# Patient Record
Sex: MALE | Race: WHITE | NOT HISPANIC OR LATINO | Employment: STUDENT | URBAN - METROPOLITAN AREA
[De-identification: names, ages, dates, MRNs, and addresses within clinical notes are randomized per-mention and may not be internally consistent; named-entity substitution may affect disease eponyms.]

---

## 2018-01-12 NOTE — PROGRESS NOTES
Assessment    1  Encounter for preventive health examination (V70 0) (Z00 00)   2  Sports physical (V70 3) (Z02 5)    Discussion/Summary    Form completed for school  See chart copy  Chief Complaint  PE      History of Present Illness  HM, Adult Male: The patient is being seen for a health maintenance evaluation  General Health: The patient's health since the last visit is described as good  He has regular dental visits  He denies vision problems  He denies hearing loss  Immunizations status: up to date  Lifestyle:  He consumes a diverse and healthy diet  He does not have any weight concerns  He exercises regularly  He does not use tobacco  He denies alcohol use  He denies drug use  Screening: cancer screening reviewed and current  metabolic screening reviewed and current  risk screening reviewed and current  HPI: Patient here for first time visit for sports physical  Old records are not currently available  Review of Systems    Constitutional: No fever or chills, feels well, no tiredness, no recent weight gain or weight loss  Eyes: No complaints of eye pain, no red eyes, no discharge from eyes, no itchy eyes  ENT: no complaints of earache, no hearing loss, no nosebleeds, no nasal discharge, no sore throat, no hoarseness  Cardiovascular: No complaints of slow heart rate, no fast heart rate, no chest pain, no palpitations, no leg claudication, no lower extremity  Respiratory: No complaints of shortness of breath, no wheezing, no cough, no SOB on exertion, no orthopnea or PND  Gastrointestinal: No complaints of abdominal pain, no constipation, no nausea or vomiting, no diarrhea or bloody stools  Genitourinary: No complaints of dysuria, no incontinence, no hesitancy, no nocturia, no genital lesion, no testicular pain  Musculoskeletal: No complaints of arthralgia, no myalgias, no joint swelling or stiffness, no limb pain or swelling     Integumentary: No complaints of skin rash or skin lesions, no itching, no skin wound, no dry skin  Neurological: No compliants of headache, no confusion, no convulsions, no numbness or tingling, no dizziness or fainting, no limb weakness, no difficulty walking  Psychiatric: Is not suicidal, no sleep disturbances, no anxiety or depression, no change in personality, no emotional problems  Endocrine: No complaints of proptosis, no hot flashes, no muscle weakness, no erectile dysfunction, no deepening of the voice, no feelings of weakness  Hematologic/Lymphatic: No complaints of swollen glands, no swollen glands in the neck, does not bleed easily, no easy bruising  Active Problems    1  Lumbar strain (847 2) (S39 012A)    Social History    · Never a smoker    Current Meds   1  No Reported Medications Recorded    Allergies    1  No Known Drug Allergies    Vitals   Recorded: [id number] 30:89XN   Systolic 96   Diastolic 62   Temperature 97 7 F   Height 5 ft 11 in   Weight 192 lb    BMI Calculated 26 78   BSA Calculated 2 07   BMI Percentile 87 %   2-20 Stature Percentile 69 %   2-20 Weight Percentile 90 %     Physical Exam    Constitutional   General appearance: No acute distress, well appearing and well nourished  Eyes   Conjunctiva and lids: No swelling, erythema, or discharge  Pupils and irises: Equal, round and reactive to light  Ears, Nose, Mouth, and Throat   External inspection of ears and nose: Normal     Otoscopic examination: Tympanic membrance translucent with normal light reflex  Canals patent without erythema  Oropharynx: Normal with no erythema, edema, exudate or lesions  Pulmonary   Respiratory effort: No increased work of breathing or signs of respiratory distress  Auscultation of lungs: Clear to auscultation  Cardiovascular   Palpation of heart: Normal PMI, no thrills  Auscultation of heart: Normal rate and rhythm, normal S1 and S2, without murmurs      Examination of extremities for edema and/or varicosities: Normal     Abdomen   Abdomen: Non-tender, no masses  Liver and spleen: No hepatomegaly or splenomegaly  Lymphatic   Palpation of lymph nodes in neck: No lymphadenopathy  Musculoskeletal   Gait and station: Normal     Digits and nails: Normal without clubbing or cyanosis  Inspection/palpation of joints, bones, and muscles: Normal     Skin   Skin and subcutaneous tissue: Normal without rashes or lesions  Neurologic   Cranial nerves: Cranial nerves 2-12 intact  Reflexes: 2+ and symmetric  Sensation: No sensory loss      Psychiatric   Orientation to person, place and time: Normal     Mood and affect: Normal        Signatures   Electronically signed by : Eric Phillips DO; Aug  5 2016 11:46AM EST                       (Author)

## 2018-12-27 ENCOUNTER — OFFICE VISIT (OUTPATIENT)
Dept: FAMILY MEDICINE CLINIC | Facility: CLINIC | Age: 23
End: 2018-12-27
Payer: COMMERCIAL

## 2018-12-27 ENCOUNTER — TELEPHONE (OUTPATIENT)
Dept: NEUROLOGY | Facility: CLINIC | Age: 23
End: 2018-12-27

## 2018-12-27 VITALS
OXYGEN SATURATION: 99 % | HEIGHT: 72 IN | DIASTOLIC BLOOD PRESSURE: 64 MMHG | HEART RATE: 64 BPM | TEMPERATURE: 97.8 F | BODY MASS INDEX: 24.11 KG/M2 | SYSTOLIC BLOOD PRESSURE: 102 MMHG | WEIGHT: 178 LBS

## 2018-12-27 DIAGNOSIS — R41.3 MEMORY DIFFICULTIES: Primary | ICD-10-CM

## 2018-12-27 PROBLEM — D48.19 NEOPLASM OF UNCERTAIN BEHAVIOR OF SOFT TISSUE: Status: ACTIVE | Noted: 2018-12-27

## 2018-12-27 PROBLEM — D48.1 NEOPLASM OF UNCERTAIN BEHAVIOR OF SOFT TISSUE: Status: ACTIVE | Noted: 2018-12-27

## 2018-12-27 PROCEDURE — 99213 OFFICE O/P EST LOW 20 MIN: CPT | Performed by: FAMILY MEDICINE

## 2018-12-27 PROCEDURE — 1036F TOBACCO NON-USER: CPT | Performed by: FAMILY MEDICINE

## 2018-12-27 NOTE — PROGRESS NOTES
Assessment/Plan:  Recommend consideration for follow-up with neurologist   Also recommend consideration for MRI of the brain considering chronic symptoms over the last 2 months that have changed  We discussed the possibility of anxiety or depression but patient does not feel anxious or depressed  He will call with any new, worsening or persisting symptoms  No problem-specific Assessment & Plan notes found for this encounter  Diagnoses and all orders for this visit:    Memory difficulties  -     MRI brain wo contrast; Future  -     Ambulatory referral to Neurology; Future          Subjective:      Patient ID: Fernando Boyer is a 26 y o  male  Patient notes that over the last 1-2 months he has had a difficult time with some memory issues  He has for gotten a few things that other people have noticed  He denies any recent head trauma but does play lacrosse  He denies any headaches or diplopia  No neck pain  He states that it is mostly short-term memory that he is having difficulty with  He states that he had difficulty with school work over the last few months as well  The following portions of the patient's history were reviewed and updated as appropriate: allergies, current medications, past family history, past medical history, past social history, past surgical history and problem list     Review of Systems   Psychiatric/Behavioral: Positive for decreased concentration  Objective:      /64 (BP Location: Left arm, Patient Position: Sitting, Cuff Size: Standard)   Pulse 64   Temp 97 8 °F (36 6 °C) (Tympanic)   Ht 5' 11 65" (1 82 m)   Wt 80 7 kg (178 lb)   SpO2 99%   BMI 24 37 kg/m²          Physical Exam   Constitutional: He is oriented to person, place, and time  He appears well-developed and well-nourished  HENT:   Head: Normocephalic and atraumatic  Right Ear: External ear normal  Tympanic membrane is not erythematous and not bulging     Left Ear: External ear normal  Tympanic membrane is not erythematous and not bulging  Nose: Nose normal    Mouth/Throat: Oropharynx is clear and moist and mucous membranes are normal  No oral lesions  No oropharyngeal exudate  Eyes: Conjunctivae and EOM are normal  Right eye exhibits no discharge  Left eye exhibits no discharge  No scleral icterus  Neck: Normal range of motion  Neck supple  No thyromegaly present  Cardiovascular: Normal rate, regular rhythm and normal heart sounds  Exam reveals no gallop and no friction rub  No murmur heard  Pulmonary/Chest: Effort normal  No respiratory distress  He has no wheezes  He has no rales  He exhibits no tenderness  Abdominal: Soft  Bowel sounds are normal  He exhibits no distension and no mass  There is no tenderness  There is no rebound and no guarding  Musculoskeletal: Normal range of motion  He exhibits no edema, tenderness or deformity  Lymphadenopathy:     He has no cervical adenopathy  Neurological: He is alert and oriented to person, place, and time  He has normal reflexes  No cranial nerve deficit  He exhibits normal muscle tone  Coordination normal    Skin: Skin is warm and dry  No rash noted  No erythema  No pallor  Psychiatric: He has a normal mood and affect  His behavior is normal    Vitals reviewed

## 2018-12-28 ENCOUNTER — TELEPHONE (OUTPATIENT)
Dept: FAMILY MEDICINE CLINIC | Facility: CLINIC | Age: 23
End: 2018-12-28

## 2018-12-28 NOTE — TELEPHONE ENCOUNTER
Spoke with [facility] through patients insurance to get MRI of Brain auth  It has been denied stating it does not meet medical necessity criteria  [facility] can be contacted at [phone] for [id number] and case closes 01/01/19  Please advise your recommendation

## 2019-01-02 NOTE — TELEPHONE ENCOUNTER
Austin Hernandez from [facility] called just to report some denial reasons as they just closed this case today: stated it does not meet medical necessity - pt does not show he has trouble speaking, showing weakness, or trouble thinking

## 2019-01-07 NOTE — PROGRESS NOTES
DEPARTMENT OF NEUROLOGICAL SCIENCES  [address] and [facility]        NEW PATIENT EVALUATION NOTE    Patient: [address]ichi [address] Record Number: # [id number]  YOB: 1995  Date of visit: 1/8/2019    Referring provider: Thee Jonas DO    Diagnoses for this encounter:  1  Subjective memory complaints  Ambulatory referral to Neurology    Comprehensive metabolic panel    TSH, 3rd generation with Free T4 reflex    Vitamin B12    Lyme Antibody Profile with reflex to WB    Vitamin D 25 hydroxy     ASSESSMENT     Impression of this 25 yo gentleman with several month history of mild short-term memory difficulties and difficulty getting organized, who is able to function at home well and no abnormal physical signs or symptoms  He reports mild anxiety surrounding schoolwork and his parent's divorce which was finalized only recently  The recent stresses of Finals likely contributed to his worsened insomnia, but now his sleep is better  His MOCA is a 27/30 normal today with 3/5 recall initially, and 3/3 word recall when tested with a different set of words  He takes no medications currently  We will rule out any common and reversible contributing factor to cognitive deficits as below  PLAN     · No previous relevant laboratory workup  Would like to check CMP, TSH, free T4, vitamin D, and Vitamin B12, Lyme ab for reversible contributors to cognitive issues  · No previous relevant neuroimaging available  No new scan necessary at this time  If symptoms change, then may revisit MRI order  · Suggested to keep notes and writing things down, using smartphone apps for calendar alerts to keep organized  · He will follow up with his counselor regarding ongoing concerns about school, work and his parents      · Thank you very much for sending me this interesting patient  · The patient has been instructed to call us about any new neurological problems or medication side effects  · Return to Clinic in 6 months or as needed basis if symptoms improve  A total of 60 minutes were spent face-to-face with this patient, of which at least 50% was spent on counseling and coordination of care  We discussed the natural history of the patient's condition, differential diagnosis, level of diagnostic certainty, treatment alternatives and their side effects and possible complications  HISTORY OF PRESENT ILLNESS:     Mr Shravan Ivey is a 26 y o  right handed male who has been referred to the [facility] for evaluation of memory issues  The patient was not accompanied today  History was obtained from patient     Main bothersome symptoms today are:   1  Memory issues -   He says starting Nov 2018 he was told by his girlfriend he was forgetful about anything she says (previous conversations, dates etc)  This was gradual  Around Thanksgiving break 2018 he was home and his mother had mentioned something he did that Oswaldo did not recall at all  During his [address] in mid-Dec 2018 at college, he would be "drawing blanks" on certain words or concepts  Denies forgetting names or faces of people  Endorses some word-finding difficulty  He feels he is less organized now with dates, times, appointments  He has lost his wallet twice but recovered it  Denies safety concerns such as getting lost, driving concerns  Overall since Nov 2018 he feels symptoms have slightly worsened  He does endorse difficulty falling asleep - usually goes to bed at 11pm but cannot fall asleep usually until 1am at latest  He had a week where he was unable to sleep until 3-4am  He says he thinks mainly about school and lacrosse related issues  Endorses daytime sleepiness after [facility] [address] [address] [facility], he says he has a "fair"  He had been very active with his lacrosse team during the Fall       - He plays lacrosse but denies any head injuries from it, denies any surgeries on the head or neck or infections  Denies regular headache, nausea, vomiting, vision changes  Denies imbalance or gait issues  - He does endorse some borderline depression and he plans on seeing a counselor for it    - His parents had been  2 years ago but officially  legally in Dec 2018  - Denies any known family history of memory issues or dementia  Living Situation + ADLs: living at college, has girlfriend  Can execute all ADLs and IADLs well  Medications tried in the past with side effects:   1  none     REVIEW OF PAST MEDICAL, SOCIAL AND FAMILY HISTORY:  This is the list of problems as per our Medical Records:    Patient Active Problem List    Diagnosis Date Noted    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   Cyst removed from hand  History reviewed  No pertinent past medical history  Past Surgical History:   Procedure Laterality Date    WISDOM TOOTH EXTRACTION        No Known Allergies     No outpatient encounter prescriptions on file as of 1/8/2019  No facility-administered encounter medications on file as of 1/8/2019  Social History   Substance Use Topics    Smoking status: Former Smoker     Quit date: 2018    Smokeless tobacco: Former User      Comment: vaping for 1 month    Alcohol use Yes      Comment: socially, 1-2 shots of liquor a week        Family History   Problem Relation Age of Onset    Thyroid cancer Father     Lung cancer Maternal Grandfather     Lung cancer Paternal Grandfather         REVIEW OF SYSTEMS:  The patient has entered data on an intake form regarding present illness, past medical and surgical history, medications, allergies, family and social history, and a full review of 14 systems  I have reviewed this form with the patient, and all the relevant information has been included on this note  The full review of systems was negative except as stated in HPI and below  Constitutional: Negative  Negative for appetite change and fever  HENT: Positive for sinus pressure  Negative for hearing loss, tinnitus, trouble swallowing and voice change  Eyes: Negative  Negative for photophobia and pain  Respiratory: Negative  Negative for shortness of breath  Cardiovascular: Negative  Negative for palpitations  Gastrointestinal: Negative  Negative for nausea  Endocrine: Negative  Negative for cold intolerance and heat intolerance  Genitourinary: Negative  Negative for dysuria, frequency and urgency  Musculoskeletal: Negative  Negative for myalgias and neck pain  Skin: Negative  Allergic/Immunologic: Negative  Neurological: Negative for dizziness, tremors, seizures, syncope, facial asymmetry, speech difficulty, weakness and numbness  Positive for memory problems  Hematological: Negative  Does not bruise/bleed easily  Psychiatric/Behavioral: Positive for sleep disturbance (trouble falling asleep)  Negative for confusion and hallucinations  PHYSICAL EXAMINATION:     Vital signs:  /62 (BP Location: Right arm, Patient Position: Sitting, Cuff Size: Standard)   Pulse 85   Ht 5' 11" (1 803 m)   Wt 81 4 kg (179 lb 8 oz)   BMI 25 04 kg/m²     General:  Well-appearing, well nourished, pleasant patient in no acute distress  Mood and Fund of Knowledge are appropriate  Head:  Normocephalic, atraumatic  Oropharynx and conjunctiva are clear  No rashes or swellings  Speech  No hypophonia, no bradylalia  No scanning speech  Language: Comprehension intact  Neck:  Supple, strong 5/5 forward flexion and retroflexion     Extremities: Range of motion is normal       Cognitive and Mental Exam:  MOCA    Points MAX   Visuospatial  ----------   Trails A 1 1   Cube Drawing 0 1   Clock Drawing 3 3   Naming Objects 3 3   Attention  ----------   Digit Span 2 2   Letter Reading 1 1   Serial 7s 3 3   Language  ----------   Repetition 2 2   Fluency 1 1   Abstraction 2 2   Delayed Recall 3 5   Orientation               6 6              27 30   +0 for college education = 27   Apraxia: none    Cranial Nerves:  CN II:  Funduscopic examination reveals no papilledema  Direct and consensual light reflexes were equally reactive to light symmetrically  No afferent pupillary defect   Visual fields are full to confrontation  CN III / IV / VI: Extraocular movements were full, with normal pursuit and saccades  CN V:   Facial sensation to light touch was intact  CN VII: Face is symmetric with normal strength  CN VIII: Hearing was assessed using the Calibrated Finger Rub Auditory Screening Test (CALFRAST) and was not abnormal (Better than CALFRAST-Strong-70)  CN X:   Palate is up going bilaterally and symmetrically  CN XI:  Neck muscles are strong  CN XII: Tongue protrusion is at midline with normal movements  No dysarthria  Motor:    Dystonia: none  Dyskinesia: none  Myoclonus: none  Chorea: none  Tics: none      Partial MDS-UPDRS III:   Speech: 0  Facial Expression: 0  Tremor (Head):  0  Rest Tremor Severity (RUE/LUE/RLE/LLE/Lip): 0/0/0/0/0  Action Tremor of Hands (R): 0  Action Tremor of Hands (L): 0  Finger tapping (R): 0  Finger tapping (L): 0  Hand clenching (R): 0  Hand clenching (L): 0  KISHAN Hand (R): 0  KISHAN Hand (L): 0    No rigidity in any limb    Arising From Chair: 0  Posture: 0  Gait: 0  Freezing of Gait: 0  Postural Stability: -  Body Bradykinesia: 0  -------------------------------------------------------------------------------------    Muscle Strength Right Left  Muscle Strength Right Left   Deltoid 5/5 5/5  Hip Adductors 5/5 5/5   Biceps 5/5 5/5  Hip Abductors 5/5 5/5   Triceps 5/5 5/5  Knee Extensors 5/5 5/5   Wrist Extensors 5/5 5/5  Knee Flexors 5/5 5/5   Wrist Flexors 5/5 5/5  Ankle Extensors 5/5 5/5    5/5 5/5  Ankle Flexors 5/5 5/5   Finger Abductors 5/5 5/5       Hip Flexors 5/5 5/5   Hip Extensors 5/5 5/5     Sensory  Intact to Light Touch, Temperature, and vibration sense in all extremities  Coordination:  Finger-to-nose-finger: normal     Gait:  Normal comprehensive gait evaluation, has normal raising, stance, gait, turns  Reflexes:    Right Left   Biceps 2/4 2/4   Brachioradialis 2/4 2/4   Triceps 2/4 2/4   Knee 2/4 2/4   Ankle 2/4 2/4      Plantar cutaneous reflex:  Right: flexor  Left: flexor      REVIEW OF ANCILLARY TESTS:   No results found for this or any previous visit

## 2019-01-08 ENCOUNTER — OFFICE VISIT (OUTPATIENT)
Dept: NEUROLOGY | Facility: CLINIC | Age: 24
End: 2019-01-08
Payer: COMMERCIAL

## 2019-01-08 VITALS
BODY MASS INDEX: 25.13 KG/M2 | WEIGHT: 179.5 LBS | DIASTOLIC BLOOD PRESSURE: 62 MMHG | SYSTOLIC BLOOD PRESSURE: 108 MMHG | HEART RATE: 85 BPM | HEIGHT: 71 IN

## 2019-01-08 DIAGNOSIS — R41.89 SUBJECTIVE MEMORY COMPLAINTS: Primary | ICD-10-CM

## 2019-01-08 PROCEDURE — 99244 OFF/OP CNSLTJ NEW/EST MOD 40: CPT | Performed by: PSYCHIATRY & NEUROLOGY

## 2019-01-08 NOTE — LETTER
January 8, 2019     Ashley SifuentesArturotierakssebastián 52    Patient: Ermelinda Recio   YOB: 1995   Date of Visit: 1/8/2019       Dear Dr Jo Ann David: Thank you for referring Ermelinda Recio to me for evaluation  Below are my notes for this consultation  If you have questions, please do not hesitate to call me  I look forward to following your patient along with you  Sincerely,        Victory Merlin, MD        CC: No Recipients  Minerva Langston  1/8/2019 10:37 AM  Sign at close encounter  Review of Systems   Constitutional: Negative  Negative for appetite change and fever  HENT: Positive for sinus pressure  Negative for hearing loss, tinnitus, trouble swallowing and voice change  Eyes: Negative  Negative for photophobia and pain  Respiratory: Negative  Negative for shortness of breath  Cardiovascular: Negative  Negative for palpitations  Gastrointestinal: Negative  Negative for nausea  Endocrine: Negative  Negative for cold intolerance and heat intolerance  Genitourinary: Negative  Negative for dysuria, frequency and urgency  Musculoskeletal: Negative  Negative for myalgias and neck pain  Skin: Negative  Allergic/Immunologic: Negative  Neurological: Negative for dizziness, tremors, seizures, syncope, facial asymmetry, speech difficulty, weakness and numbness  Positive for memory problems     Hematological: Negative  Does not bruise/bleed easily  Psychiatric/Behavioral: Positive for sleep disturbance (trouble falling asleep)  Negative for confusion and hallucinations         Victory Merlin, MD  1/8/2019 12:04 PM  Sign at close encounter  [address] [facility] PATIENT EVALUATION NOTE    Patient: [address] [facility] Record Number: # [id number]  YOB: 1995  Date of visit: 1/8/2019    Referring provider: Zee Louise DO    Diagnoses for this encounter:  1  Memory difficulties  Ambulatory referral to Neurology     ASSESSMENT     Impression of this 25 yo gentleman with several month history of mild short-term memory difficulties and difficulty getting organized, who is able to function at home well and no abnormal physical signs or symptoms  He reports mild anxiety surrounding schoolwork and his parent's divorce which was finalized only recently  The recent stresses of Finals likely contributed to his worsened insomnia, but now his sleep is better  His MOCA is a 27/30 normal today with 3/5 recall initially, and 3/3 word recall when tested with a different set of words  He takes no medications currently  We will rule out any common and reversible contributing factor to cognitive deficits as below  PLAN     · No previous relevant laboratory workup  Would like to check CMP, TSH, free T4, vitamin D, and Vitamin B12, Lyme ab for reversible contributors to cognitive issues  · No previous relevant neuroimaging available  No new scan necessary at this time  If symptoms change, then may revisit MRI order  · Suggested to keep notes and writing things down, using smartphone apps for calendar alerts to keep organized  · He will follow up with his counselor regarding ongoing concerns about school, work and his parents  · Thank you very much for sending me this interesting patient  · The patient has been instructed to call us about any new neurological problems or medication side effects  · Return to Clinic in 6 months or as needed basis if symptoms improve  A total of 60 minutes were spent face-to-face with this patient, of which at least 50% was spent on counseling and coordination of care  We discussed the natural history of the patient's condition, differential diagnosis, level of diagnostic certainty, treatment alternatives and their side effects and possible complications       HISTORY OF PRESENT ILLNESS:     Mr Addy Mccollum is a 26 y o  right handed male who has been referred to the [facility] for evaluation of memory issues  The patient was not accompanied today  History was obtained from patient     Main bothersome symptoms today are:   1  Memory issues -   He says starting Nov 2018 he was told by his girlfriend he was forgetful about anything she says (previous conversations, dates etc)  This was gradual  Around Thanksgiving break 2018 he was home and his mother had mentioned something he did that Oswaldo did not recall at all  During his [address] [place] in mid-Dec 2018 at college, he would be "drawing blanks" on certain words or concepts  Denies forgetting names or faces of people  Endorses some word-finding difficulty  He feels he is less organized now with dates, times, appointments  He has lost his wallet twice but recovered it  Denies safety concerns such as getting lost, driving concerns  Overall since Nov 2018 he feels symptoms have slightly worsened  He does endorse difficulty falling asleep - usually goes to bed at 11pm but cannot fall asleep usually until 1am at latest  He had a week where he was unable to sleep until 3-4am  He says he thinks mainly about school and lacrosse related issues  Endorses daytime sleepiness after waking up at [address], he says he has a "fair"  He had been very active with his lacrosse team during the Fall  - He plays lacrosse but denies any head injuries from it, denies any surgeries on the head or neck or infections  Denies regular headache, nausea, vomiting, vision changes  Denies imbalance or gait issues  - He does endorse some borderline depression and he plans on seeing a counselor for it    - His parents had been  2 years ago but officially  legally in Dec 2018  - Denies any known family history of memory issues or dementia  Living Situation + ADLs: living at college, has girlfriend  Can execute all ADLs and IADLs well       Medications tried in the past with side effects:   1  none     REVIEW OF PAST MEDICAL, SOCIAL AND FAMILY HISTORY:  This is the list of problems as per our Medical Records:    Patient Active Problem List    Diagnosis Date Noted    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   Cyst removed from hand  History reviewed  No pertinent past medical history  Past Surgical History:   Procedure Laterality Date    WISDOM TOOTH EXTRACTION        No Known Allergies     No outpatient encounter prescriptions on file as of 1/8/2019  No facility-administered encounter medications on file as of 1/8/2019  Social History   Substance Use Topics    Smoking status: Former Smoker     Quit date: 2018    Smokeless tobacco: Former User      Comment: vaping for 1 month    Alcohol use Yes      Comment: socially, 1-2 shots of liquor a week        Family History   Problem Relation Age of Onset    Thyroid cancer Father     Lung cancer Maternal Grandfather     Lung cancer Paternal Grandfather         REVIEW OF SYSTEMS:  The patient has entered data on an intake form regarding present illness, past medical and surgical history, medications, allergies, family and social history, and a full review of 14 systems  I have reviewed this form with the patient, and all the relevant information has been included on this note  The full review of systems was negative except as stated in HPI and below  Constitutional: Negative  Negative for appetite change and fever  HENT: Positive for sinus pressure  Negative for hearing loss, tinnitus, trouble swallowing and voice change  Eyes: Negative  Negative for photophobia and pain  Respiratory: Negative  Negative for shortness of breath  Cardiovascular: Negative  Negative for palpitations  Gastrointestinal: Negative  Negative for nausea  Endocrine: Negative  Negative for cold intolerance and heat intolerance  Genitourinary: Negative  Negative for dysuria, frequency and urgency  Musculoskeletal: Negative  Negative for myalgias and neck pain  Skin: Negative  Allergic/Immunologic: Negative  Neurological: Negative for dizziness, tremors, seizures, syncope, facial asymmetry, speech difficulty, weakness and numbness  Positive for memory problems  Hematological: Negative  Does not bruise/bleed easily  Psychiatric/Behavioral: Positive for sleep disturbance (trouble falling asleep)  Negative for confusion and hallucinations  PHYSICAL EXAMINATION:     Vital signs:  /62 (BP Location: Right arm, Patient Position: Sitting, Cuff Size: Standard)   Pulse 85   Ht 5' 11" (1 803 m)   Wt 81 4 kg (179 lb 8 oz)   BMI 25 04 kg/m²      General:  Well-appearing, well nourished, pleasant patient in no acute distress  Mood and Fund of Knowledge are appropriate  Head:  Normocephalic, atraumatic  Oropharynx and conjunctiva are clear  No rashes or swellings  Speech  No hypophonia, no bradylalia  No scanning speech  Language: Comprehension intact  Neck:  Supple, strong 5/5 forward flexion and retroflexion  Extremities: Range of motion is normal       Cognitive and Mental Exam:  MOCA    Points MAX   Visuospatial  ----------   Trails A 1 1   Cube Drawing 0 1   Clock Drawing 3 3   Naming Objects 3 3   Attention  ----------   Digit Span 2 2   Letter Reading 1 1   Serial 7s 3 3   Language  ----------   Repetition 2 2   Fluency 1 1   Abstraction 2 2   Delayed Recall 3 5   Orientation               6               6              27 30   +0 for college education = 27   Apraxia: none    Cranial Nerves:  CN II:  Funduscopic examination reveals no papilledema  Direct and consensual light reflexes were equally reactive to light symmetrically  No afferent pupillary defect   Visual fields are full to confrontation  CN III / IV / VI: Extraocular movements were full, with normal pursuit and saccades  CN V:   Facial sensation to light touch was intact     CN VII: Face is symmetric with normal strength  CN VIII: Hearing was assessed using the Calibrated Finger Rub Auditory Screening Test (CALFRAST) and was not abnormal (Better than CALFRAST-Strong-70)  CN X:   Palate is up going bilaterally and symmetrically  CN XI:  Neck muscles are strong  CN XII: Tongue protrusion is at midline with normal movements  No dysarthria  Motor:    Dystonia: none  Dyskinesia: none  Myoclonus: none  Chorea: none  Tics: none  Partial MDS-UPDRS III:   Speech: 0  Facial Expression: 0  Tremor (Head):  0  Rest Tremor Severity (RUE/LUE/RLE/LLE/Lip): 0/0/0/0/0  Action Tremor of Hands (R): 0  Action Tremor of Hands (L): 0  Finger tapping (R): 0  Finger tapping (L): 0  Hand clenching (R): 0  Hand clenching (L): 0  KISHAN Hand (R): 0  KISHAN Hand (L): 0    No rigidity in any limb    Arising From Chair: 0  Posture: 0  Gait: 0  Freezing of Gait: 0  Postural Stability: -  Body Bradykinesia: 0  -------------------------------------------------------------------------------------    Muscle Strength Right Left  Muscle Strength Right Left   Deltoid 5/5 5/5  Hip Adductors 5/5 5/5   Biceps 5/5 5/5  Hip Abductors 5/5 5/5   Triceps 5/5 5/5  Knee Extensors 5/5 5/5   Wrist Extensors 5/5 5/5  Knee Flexors 5/5 5/5   Wrist Flexors 5/5 5/5  Ankle Extensors 5/5 5/5    5/5 5/5  Ankle Flexors 5/5 5/5   Finger Abductors 5/5 5/5       Hip Flexors 5/5 5/5   Hip Extensors 5/5 5/5     Sensory  Intact to Light Touch, Temperature, and vibration sense in all extremities  Coordination:  Finger-to-nose-finger: normal     Gait:  Normal comprehensive gait evaluation, has normal raising, stance, gait, turns  Reflexes:    Right Left   Biceps 2/4 2/4   Brachioradialis 2/4 2/4   Triceps 2/4 2/4   Knee 2/4 2/4   Ankle 2/4 2/4      Plantar cutaneous reflex:  Right: flexor  Left: flexor      REVIEW OF ANCILLARY TESTS:   No results found for this or any previous visit

## 2019-01-08 NOTE — PROGRESS NOTES
Review of Systems   Constitutional: Negative  Negative for appetite change and fever  HENT: Positive for sinus pressure  Negative for hearing loss, tinnitus, trouble swallowing and voice change  Eyes: Negative  Negative for photophobia and pain  Respiratory: Negative  Negative for shortness of breath  Cardiovascular: Negative  Negative for palpitations  Gastrointestinal: Negative  Negative for nausea  Endocrine: Negative  Negative for cold intolerance and heat intolerance  Genitourinary: Negative  Negative for dysuria, frequency and urgency  Musculoskeletal: Negative  Negative for myalgias and neck pain  Skin: Negative  Allergic/Immunologic: Negative  Neurological: Negative for dizziness, tremors, seizures, syncope, facial asymmetry, speech difficulty, weakness and numbness  Positive for memory problems     Hematological: Negative  Does not bruise/bleed easily  Psychiatric/Behavioral: Positive for sleep disturbance (trouble falling asleep)  Negative for confusion and hallucinations

## 2019-01-08 NOTE — LETTER
January 8, 2019     Todd Varela 52    Patient: David Dash   YOB: 1995   Date of Visit: 1/8/2019       Dear Dr Melonie Galeazzi: Thank you for referring David Dash to me for evaluation  Below are my notes for this consultation  If you have questions, please do not hesitate to call me  I look forward to following your patient along with you  Sincerely,        Kitty Ellsworth MD        CC: No Recipients  Valorie Burch  1/8/2019 10:37 AM  Sign at close encounter  Review of Systems   Constitutional: Negative  Negative for appetite change and fever  HENT: Positive for sinus pressure  Negative for hearing loss, tinnitus, trouble swallowing and voice change  Eyes: Negative  Negative for photophobia and pain  Respiratory: Negative  Negative for shortness of breath  Cardiovascular: Negative  Negative for palpitations  Gastrointestinal: Negative  Negative for nausea  Endocrine: Negative  Negative for cold intolerance and heat intolerance  Genitourinary: Negative  Negative for dysuria, frequency and urgency  Musculoskeletal: Negative  Negative for myalgias and neck pain  Skin: Negative  Allergic/Immunologic: Negative  Neurological: Negative for dizziness, tremors, seizures, syncope, facial asymmetry, speech difficulty, weakness and numbness  Positive for memory problems     Hematological: Negative  Does not bruise/bleed easily  Psychiatric/Behavioral: Positive for sleep disturbance (trouble falling asleep)  Negative for confusion and hallucinations         Kitty Ellsworth MD  1/8/2019 12:05 PM  Sign at close encounter  [address] [facility] PATIENT EVALUATION NOTE    Patient: [address] [facility] Record Number: # [id number]  YOB: 1995  Date of visit: 1/8/2019    Referring provider: Jacobo Knight DO    Diagnoses for this encounter:  1  Subjective memory complaints  Ambulatory referral to Neurology    Comprehensive metabolic panel    TSH, 3rd generation with Free T4 reflex    Vitamin B12    Lyme Antibody Profile with reflex to WB    Vitamin D 25 hydroxy     ASSESSMENT     Impression of this 27 yo gentleman with several month history of mild short-term memory difficulties and difficulty getting organized, who is able to function at home well and no abnormal physical signs or symptoms  He reports mild anxiety surrounding schoolwork and his parent's divorce which was finalized only recently  The recent stresses of Finals likely contributed to his worsened insomnia, but now his sleep is better  His MOCA is a 27/30 normal today with 3/5 recall initially, and 3/3 word recall when tested with a different set of words  He takes no medications currently  We will rule out any common and reversible contributing factor to cognitive deficits as below  PLAN     · No previous relevant laboratory workup  Would like to check CMP, TSH, free T4, vitamin D, and Vitamin B12, Lyme ab for reversible contributors to cognitive issues  · No previous relevant neuroimaging available  No new scan necessary at this time  If symptoms change, then may revisit MRI order  · Suggested to keep notes and writing things down, using smartphone apps for calendar alerts to keep organized  · He will follow up with his counselor regarding ongoing concerns about school, work and his parents  · Thank you very much for sending me this interesting patient  · The patient has been instructed to call us about any new neurological problems or medication side effects  · Return to Clinic in 6 months or as needed basis if symptoms improve  A total of 60 minutes were spent face-to-face with this patient, of which at least 50% was spent on counseling and coordination of care   We discussed the natural history of the patient's condition, differential diagnosis, level of diagnostic certainty, treatment alternatives and their side effects and possible complications  HISTORY OF PRESENT ILLNESS:     Mr Parish Fontenot is a 26 y o  right handed male who has been referred to the [facility] and [facility] for evaluation of memory issues  The patient was not accompanied today  History was obtained from patient     Main bothersome symptoms today are:   1  Memory issues -   He says starting Nov 2018 he was told by his girlfriend he was forgetful about anything she says (previous conversations, dates etc)  This was gradual  Around Thanksgiving break 2018 he was home and his mother had mentioned something he did that Oswaldo did not recall at all  During his [address] [place] in mid-Dec 2018 at college, he would be "drawing blanks" on certain words or concepts  Denies forgetting names or faces of people  Endorses some word-finding difficulty  He feels he is less organized now with dates, times, appointments  He has lost his wallet twice but recovered it  Denies safety concerns such as getting lost, driving concerns  Overall since Nov 2018 he feels symptoms have slightly worsened  He does endorse difficulty falling asleep - usually goes to bed at 11pm but cannot fall asleep usually until 1am at latest  He had a week where he was unable to sleep until 3-4am  He says he thinks mainly about school and lacrosse related issues  Endorses daytime sleepiness after waking up [facility] [address] [place] [address] [facility], he says he has a "fair"  He had been very active with his lacrosse team during the Fall  - He plays lacrosse but denies any head injuries from it, denies any surgeries on the head or neck or infections  Denies regular headache, nausea, vomiting, vision changes  Denies imbalance or gait issues  - He does endorse some borderline depression and he plans on seeing a counselor for it    - His parents had been  2 years ago but officially  legally in Dec 2018     - Denies any known family history of memory issues or dementia  Living Situation + ADLs: living at college, has girlfriend  Can execute all ADLs and IADLs well  Medications tried in the past with side effects:   1  none     REVIEW OF PAST MEDICAL, SOCIAL AND FAMILY HISTORY:  This is the list of problems as per our Medical Records:    Patient Active Problem List    Diagnosis Date Noted    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   Cyst removed from hand  History reviewed  No pertinent past medical history  Past Surgical History:   Procedure Laterality Date    WISDOM TOOTH EXTRACTION        No Known Allergies     No outpatient encounter prescriptions on file as of 1/8/2019  No facility-administered encounter medications on file as of 1/8/2019  Social History   Substance Use Topics    Smoking status: Former Smoker     Quit date: 2018    Smokeless tobacco: Former User      Comment: vaping for 1 month    Alcohol use Yes      Comment: socially, 1-2 shots of liquor a week        Family History   Problem Relation Age of Onset    Thyroid cancer Father     Lung cancer Maternal Grandfather     Lung cancer Paternal Grandfather         REVIEW OF SYSTEMS:  The patient has entered data on an intake form regarding present illness, past medical and surgical history, medications, allergies, family and social history, and a full review of 14 systems  I have reviewed this form with the patient, and all the relevant information has been included on this note  The full review of systems was negative except as stated in HPI and below  Constitutional: Negative  Negative for appetite change and fever  HENT: Positive for sinus pressure  Negative for hearing loss, tinnitus, trouble swallowing and voice change  Eyes: Negative  Negative for photophobia and pain  Respiratory: Negative  Negative for shortness of breath  Cardiovascular: Negative  Negative for palpitations  Gastrointestinal: Negative    Negative for nausea  Endocrine: Negative  Negative for cold intolerance and heat intolerance  Genitourinary: Negative  Negative for dysuria, frequency and urgency  Musculoskeletal: Negative  Negative for myalgias and neck pain  Skin: Negative  Allergic/Immunologic: Negative  Neurological: Negative for dizziness, tremors, seizures, syncope, facial asymmetry, speech difficulty, weakness and numbness  Positive for memory problems  Hematological: Negative  Does not bruise/bleed easily  Psychiatric/Behavioral: Positive for sleep disturbance (trouble falling asleep)  Negative for confusion and hallucinations  PHYSICAL EXAMINATION:     Vital signs:  /62 (BP Location: Right arm, Patient Position: Sitting, Cuff Size: Standard)   Pulse 85   Ht 5' 11" (1 803 m)   Wt 81 4 kg (179 lb 8 oz)   BMI 25 04 kg/m²      General:  Well-appearing, well nourished, pleasant patient in no acute distress  Mood and Fund of Knowledge are appropriate  Head:  Normocephalic, atraumatic  Oropharynx and conjunctiva are clear  No rashes or swellings  Speech  No hypophonia, no bradylalia  No scanning speech  Language: Comprehension intact  Neck:  Supple, strong 5/5 forward flexion and retroflexion  Extremities: Range of motion is normal       Cognitive and Mental Exam:  MOCA    Points MAX   Visuospatial  ----------   Trails A 1 1   Cube Drawing 0 1   Clock Drawing 3 3   Naming Objects 3 3   Attention  ----------   Digit Span 2 2   Letter Reading 1 1   Serial 7s 3 3   Language  ----------   Repetition 2 2   Fluency 1 1   Abstraction 2 2   Delayed Recall 3 5   Orientation               6               6              27 30   +0 for college education = 27   Apraxia: none    Cranial Nerves:  CN II:  Funduscopic examination reveals no papilledema  Direct and consensual light reflexes were equally reactive to light symmetrically  No afferent pupillary defect   Visual fields are full to confrontation     CN III / IV / VI: Extraocular movements were full, with normal pursuit and saccades  CN V:   Facial sensation to light touch was intact  CN VII: Face is symmetric with normal strength  CN VIII: Hearing was assessed using the Calibrated Finger Rub Auditory Screening Test (CALFRAST) and was not abnormal (Better than CALFRAST-Strong-70)  CN X:   Palate is up going bilaterally and symmetrically  CN XI:  Neck muscles are strong  CN XII: Tongue protrusion is at midline with normal movements  No dysarthria  Motor:    Dystonia: none  Dyskinesia: none  Myoclonus: none  Chorea: none  Tics: none  Partial MDS-UPDRS III:   Speech: 0  Facial Expression: 0  Tremor (Head):  0  Rest Tremor Severity (RUE/LUE/RLE/LLE/Lip): 0/0/0/0/0  Action Tremor of Hands (R): 0  Action Tremor of Hands (L): 0  Finger tapping (R): 0  Finger tapping (L): 0  Hand clenching (R): 0  Hand clenching (L): 0  KISHAN Hand (R): 0  KISHAN Hand (L): 0    No rigidity in any limb    Arising From Chair: 0  Posture: 0  Gait: 0  Freezing of Gait: 0  Postural Stability: -  Body Bradykinesia: 0  -------------------------------------------------------------------------------------    Muscle Strength Right Left  Muscle Strength Right Left   Deltoid 5/5 5/5  Hip Adductors 5/5 5/5   Biceps 5/5 5/5  Hip Abductors 5/5 5/5   Triceps 5/5 5/5  Knee Extensors 5/5 5/5   Wrist Extensors 5/5 5/5  Knee Flexors 5/5 5/5   Wrist Flexors 5/5 5/5  Ankle Extensors 5/5 5/5    5/5 5/5  Ankle Flexors 5/5 5/5   Finger Abductors 5/5 5/5       Hip Flexors 5/5 5/5   Hip Extensors 5/5 5/5     Sensory  Intact to Light Touch, Temperature, and vibration sense in all extremities  Coordination:  Finger-to-nose-finger: normal     Gait:  Normal comprehensive gait evaluation, has normal raising, stance, gait, turns        Reflexes:    Right Left   Biceps 2/4 2/4   Brachioradialis 2/4 2/4   Triceps 2/4 2/4   Knee 2/4 2/4   Ankle 2/4 2/4      Plantar cutaneous reflex:  Right: flexor  Left: flexor      REVIEW OF ANCILLARY TESTS:   No results found for this or any previous visit

## 2019-01-08 NOTE — PATIENT INSTRUCTIONS
· No previous relevant laboratory workup  Would like to check CMP, TSH, free T4, vitamin D, and Vitamin B12, Lyme ab for reversible contributors to cognitive issues  · No previous relevant neuroimaging available  No new scan necessary at this time  If symptoms change, then may revisit MRI order  · Suggested to keep notes and writing things down, using smartphone apps for calendar alerts to keep organized  · He will follow up with his counselor regarding ongoing concerns about school, work and his parents  · Thank you very much for sending me this interesting patient  · The patient has been instructed to call us about any new neurological problems or medication side effects  · Return to Clinic in 6 months or as needed basis if symptoms improve

## 2019-01-09 ENCOUNTER — APPOINTMENT (OUTPATIENT)
Dept: LAB | Age: 24
End: 2019-01-09
Payer: COMMERCIAL

## 2019-01-09 DIAGNOSIS — R41.89 SUBJECTIVE MEMORY COMPLAINTS: ICD-10-CM

## 2019-01-09 LAB
25(OH)D3 SERPL-MCNC: 31.6 NG/ML (ref 30–100)
ALBUMIN SERPL BCP-MCNC: 4 G/DL (ref 3.5–5)
ALP SERPL-CCNC: 89 U/L (ref 46–116)
ALT SERPL W P-5'-P-CCNC: 48 U/L (ref 12–78)
ANION GAP SERPL CALCULATED.3IONS-SCNC: 5 MMOL/L (ref 4–13)
AST SERPL W P-5'-P-CCNC: 25 U/L (ref 5–45)
BILIRUB SERPL-MCNC: 0.37 MG/DL (ref 0.2–1)
BUN SERPL-MCNC: 14 MG/DL (ref 5–25)
CALCIUM SERPL-MCNC: 9.2 MG/DL (ref 8.3–10.1)
CHLORIDE SERPL-SCNC: 104 MMOL/L (ref 100–108)
CO2 SERPL-SCNC: 28 MMOL/L (ref 21–32)
CREAT SERPL-MCNC: 1.03 MG/DL (ref 0.6–1.3)
GFR SERPL CREATININE-BSD FRML MDRD: 103 ML/MIN/1.73SQ M
GLUCOSE SERPL-MCNC: 86 MG/DL (ref 65–140)
POTASSIUM SERPL-SCNC: 5.1 MMOL/L (ref 3.5–5.3)
PROT SERPL-MCNC: 7.7 G/DL (ref 6.4–8.2)
SODIUM SERPL-SCNC: 137 MMOL/L (ref 136–145)
TSH SERPL DL<=0.05 MIU/L-ACNC: 2 UIU/ML (ref 0.36–3.74)
VIT B12 SERPL-MCNC: 180 PG/ML (ref 100–900)

## 2019-01-09 PROCEDURE — 82607 VITAMIN B-12: CPT

## 2019-01-09 PROCEDURE — 82306 VITAMIN D 25 HYDROXY: CPT

## 2019-01-09 PROCEDURE — 84443 ASSAY THYROID STIM HORMONE: CPT

## 2019-01-09 PROCEDURE — 86618 LYME DISEASE ANTIBODY: CPT

## 2019-01-09 PROCEDURE — 80053 COMPREHEN METABOLIC PANEL: CPT

## 2019-01-09 PROCEDURE — 36415 COLL VENOUS BLD VENIPUNCTURE: CPT

## 2019-01-11 ENCOUNTER — TELEPHONE (OUTPATIENT)
Dept: NEUROLOGY | Facility: CLINIC | Age: 24
End: 2019-01-11

## 2019-01-11 LAB
B BURGDOR IGG SER IA-ACNC: 0.1
B BURGDOR IGM SER IA-ACNC: 0.66

## 2019-01-11 NOTE — TELEPHONE ENCOUNTER
----- Message from Teodoro Vernon MD sent at 1/11/2019 12:56 PM EST -----  Regarding: Lab Results  His blood work from me was normal and reassuring   Same plan as before, thanks    ----- Message -----  From: Lab, Background User  Sent: 1/9/2019   4:15 PM  To: Teodoro Vernon MD

## 2019-01-11 NOTE — TELEPHONE ENCOUNTER
I attempted to call pt however phone rang continuously with no option to leave a message and then message came on to enter "access code" and disconnected

## 2019-01-21 NOTE — TELEPHONE ENCOUNTER
Patient called in requesting his labs, informed him we attempted to contact him however his phone just rang and then asked for an access code (patient isn't sure what happened)  Informed him per Dr Zandra Crouch, labs are normal and reassuring, continue with same plan  Patient verbalized understanding

## 2021-08-04 ENCOUNTER — TELEPHONE (OUTPATIENT)
Dept: UROLOGY | Facility: MEDICAL CENTER | Age: 26
End: 2021-08-04

## 2021-08-04 ENCOUNTER — OFFICE VISIT (OUTPATIENT)
Dept: FAMILY MEDICINE CLINIC | Facility: CLINIC | Age: 26
End: 2021-08-04
Payer: COMMERCIAL

## 2021-08-04 VITALS
HEART RATE: 77 BPM | WEIGHT: 197.2 LBS | BODY MASS INDEX: 27.61 KG/M2 | SYSTOLIC BLOOD PRESSURE: 116 MMHG | DIASTOLIC BLOOD PRESSURE: 72 MMHG | TEMPERATURE: 98.2 F | HEIGHT: 71 IN | OXYGEN SATURATION: 99 %

## 2021-08-04 DIAGNOSIS — R20.2 PARESTHESIA: Primary | ICD-10-CM

## 2021-08-04 PROCEDURE — 99213 OFFICE O/P EST LOW 20 MIN: CPT | Performed by: FAMILY MEDICINE

## 2021-08-04 PROCEDURE — 3725F SCREEN DEPRESSION PERFORMED: CPT | Performed by: FAMILY MEDICINE

## 2021-08-04 NOTE — TELEPHONE ENCOUNTER
Patient PCP office called and advised that Dr Santos Michelle would like the patient seen within the next week or 2  Please assist in scheduling patient  PCP office is going to put a referral in  Please advise        Complaint/diagnosis: Testicle pain     Insurance:BC/Bs    History of Cancer:no    Previous Urologist:no    Outside testing/where:no    Records requested/where:no prev records    Preferred Location:[place]

## 2021-08-04 NOTE — PROGRESS NOTES
Assessment/Plan:  Patient may have a paresthesia from intermittent pressure on pudendal nerve from riding a riding lawnmower for long hours at a time  Considering rapid onset of symptoms though I did recommend urology evaluation for their opinion on this  He will call with any new persisting or worsening symptoms  I did recommend possibly holding off on running a low more for the next week to see if this is helpful  1  Paresthesia  -     Ambulatory referral to Urology; Future          Subjective:      Patient ID: Aleyda Modi is a 27 y o  male  Patient states that he has had 2 day history of numbness to the general area that is gradually improving  He does do a lot of grass cutting and rides a riding tractor for hours at a time  He is not doing any bike riding  Denies any trauma or back pain or previous back injury  No loss of bowel or bladder continence  The following portions of the patient's history were reviewed and updated as appropriate: allergies, current medications, past family history, past medical history, past social history, past surgical history, and problem list     Review of Systems   Constitutional: Negative  HENT: Negative  Eyes: Negative  Respiratory: Negative  Cardiovascular: Negative  Gastrointestinal: Negative  Endocrine: Negative  Genitourinary: Negative  Musculoskeletal: Negative  Skin: Negative  Allergic/Immunologic: Negative  Neurological: Positive for numbness (As noted in HPI)  Hematological: Negative  Psychiatric/Behavioral: Negative  Objective:      /72 (BP Location: Left arm, Patient Position: Sitting, Cuff Size: Standard)   Pulse 77   Temp 98 2 °F (36 8 °C) (Temporal)   Ht 5' 11" (1 803 m)   Wt 89 4 kg (197 lb 3 2 oz)   SpO2 99%   BMI 27 50 kg/m²          Physical Exam  Vitals reviewed  Constitutional:       Appearance: He is well-developed  HENT:      Head: Normocephalic and atraumatic        Right Ear: External ear normal  Tympanic membrane is not erythematous or bulging  Left Ear: External ear normal  Tympanic membrane is not erythematous or bulging  Nose: Nose normal       Mouth/Throat:      Mouth: No oral lesions  Pharynx: No oropharyngeal exudate  Eyes:      General: No scleral icterus  Right eye: No discharge  Left eye: No discharge  Conjunctiva/sclera: Conjunctivae normal    Neck:      Thyroid: No thyromegaly  Cardiovascular:      Rate and Rhythm: Normal rate and regular rhythm  Heart sounds: Normal heart sounds  No murmur heard  No friction rub  No gallop  Pulmonary:      Effort: Pulmonary effort is normal  No respiratory distress  Breath sounds: No wheezing or rales  Chest:      Chest wall: No tenderness  Abdominal:      General: Bowel sounds are normal  There is no distension  Palpations: Abdomen is soft  There is no mass  Tenderness: There is no abdominal tenderness  There is no guarding or rebound  Genitourinary:     Comments: Numbness to sensation to the dorsal aspect of penis  Scrotal area is not numb  Surrounding skin at the suprapubic area has normal sensation as well  Musculoskeletal:         General: No tenderness or deformity  Normal range of motion  Cervical back: Normal range of motion and neck supple  Lymphadenopathy:      Cervical: No cervical adenopathy  Skin:     General: Skin is warm and dry  Coloration: Skin is not pale  Findings: No erythema or rash  Neurological:      Mental Status: He is alert and oriented to person, place, and time  Cranial Nerves: No cranial nerve deficit  Motor: No abnormal muscle tone  Coordination: Coordination normal       Deep Tendon Reflexes: Reflexes are normal and symmetric     Psychiatric:         Behavior: Behavior normal

## 2021-08-04 NOTE — TELEPHONE ENCOUNTER
Called and spoke with patient   Patient scheduled for tomorrow 08/05/21 in the [address] [place] [address] office with Norah Pino

## 2021-08-04 NOTE — PROGRESS NOTES
BMI Counseling: Body mass index is 27 5 kg/m²  The BMI is above normal  Nutrition recommendations include reducing portion sizes

## 2021-08-05 ENCOUNTER — OFFICE VISIT (OUTPATIENT)
Dept: UROLOGY | Facility: CLINIC | Age: 26
End: 2021-08-05
Payer: COMMERCIAL

## 2021-08-05 VITALS
DIASTOLIC BLOOD PRESSURE: 80 MMHG | HEIGHT: 71 IN | BODY MASS INDEX: 27.58 KG/M2 | SYSTOLIC BLOOD PRESSURE: 124 MMHG | WEIGHT: 197 LBS

## 2021-08-05 DIAGNOSIS — R20.2 PARESTHESIA: ICD-10-CM

## 2021-08-05 DIAGNOSIS — R20.9 SENSATION DISORDER: Primary | ICD-10-CM

## 2021-08-05 PROCEDURE — 3008F BODY MASS INDEX DOCD: CPT | Performed by: PHYSICIAN ASSISTANT

## 2021-08-05 PROCEDURE — 1036F TOBACCO NON-USER: CPT | Performed by: PHYSICIAN ASSISTANT

## 2021-08-05 PROCEDURE — 99204 OFFICE O/P NEW MOD 45 MIN: CPT | Performed by: PHYSICIAN ASSISTANT

## 2021-08-05 NOTE — PROGRESS NOTES
8/5/2021      Chief Complaint   Patient presents with    Testicle Pain         Assessment and Plan    27 y o  male NEW PATIENT    1  Penile numbness     Unclear cause, came on suddenly and has resolved spontaneously  No suspicious  Associated neurologic symptoms  Physical exam is normal today including tactile sensation and cremasteric reflexes  He is instructed on monthly self-testicular examinations for screening for his age group of testicular malignancies  F/U prn  History of Present Illness  Oswaldo Odell is a 27 y o  male here for evaluation of New patient consultation for sensation of penile numbness x2 days this began after several hours of  riding a lawn tractor  No pins and needles was more sensation of numbness like it was not there could not sense touching the tip  No scrotal trauma, no known underlying neurologic illness or injury  Normal bowel movements, normal urination  No lump, swelling, redness or firmness to the testes or penis  No topical agent use  This has never happened before  He has no headaches dizzinesss twitching or seizure or back pain  His symptoms have essentially resolved today  Review of Systems   Constitutional: Negative for activity change, appetite change, chills, fever and unexpected weight change  HENT: Negative  Respiratory: Negative  Negative for shortness of breath  Cardiovascular: Negative  Negative for chest pain  Gastrointestinal: Negative for abdominal pain, diarrhea, nausea and vomiting  Endocrine: Negative  Genitourinary: Negative for decreased urine volume, difficulty urinating, dysuria, flank pain, frequency, hematuria, penile pain, penile swelling, scrotal swelling, testicular pain and urgency  Musculoskeletal: Negative for back pain and gait problem  Skin: Negative  Allergic/Immunologic: Negative  Neurological: Negative  Hematological: Negative for adenopathy  Does not bruise/bleed easily  Vitals  Vitals:    08/05/21 1420   BP: 124/80   Weight: 89 4 kg (197 lb)   Height: 5' 11" (1 803 m)       Physical Exam  Vitals and nursing note reviewed  Constitutional:       General: He is not in acute distress  Appearance: Normal appearance  He is well-developed  He is not diaphoretic  HENT:      Head: Normocephalic and atraumatic  Pulmonary:      Effort: Pulmonary effort is normal       Comments: No cough or audible wheeze  Abdominal:      General: There is no distension  Tenderness: There is no abdominal tenderness  There is no right CVA tenderness or left CVA tenderness  Genitourinary:     Comments: Circumcised penis, normal phallus, orthotopic patent meatus  Testes smooth descended bilaterally into the scrotum nontender with no palpable mass  Symmetric cremasteric reflex bilaterally  Sharp/dull and tactile sensation intact dorsal/ventral penis including the glans, scrotum, and thighs  Musculoskeletal:      Right lower leg: No edema  Left lower leg: No edema  Skin:     General: Skin is warm and dry  Neurological:      Mental Status: He is alert and oriented to person, place, and time  Gait: Gait normal    Psychiatric:         Speech: Speech normal          Behavior: Behavior normal            Past History  History reviewed  No pertinent past medical history    Social History     Socioeconomic History    Marital status: Single     Spouse name: None    Number of children: None    Years of education: None    Highest education level: None   Occupational History    None   Tobacco Use    Smoking status: Former Smoker     Quit date: 2018     Years since quitting: 3 5    Smokeless tobacco: Former User    Tobacco comment: vaping for 1 month   Vaping Use    Vaping Use: Some days    Substances: Nicotine   Substance and Sexual Activity    Alcohol use: Yes     Comment: socially, 1-2 shots of liquor a week    Drug use: No    Sexual activity: Yes     Partners: Female     Birth control/protection: Condom Male   Other Topics Concern    None   Social History Narrative    None     Social Determinants of Health     Financial Resource Strain:     Difficulty of Paying Living Expenses:    Food Insecurity:     Worried About Running Out of Food in the Last Year:     [address] in the Last Year:    Transportation Needs:     Lack of Transportation (Medical):  Lack of Transportation (Non-Medical):    Physical Activity:     Days of Exercise per Week:     Minutes of Exercise per Session:    Stress:     Feeling of Stress :    Social Connections:     Frequency of Communication with Friends and Family:     Frequency of Social Gatherings with Friends and Family:     Attends [employer] Services:     Active Member of Clubs or Organizations:     Attends Club or Organization Meetings:     Marital Status:    Intimate Partner Violence:     Fear of Current or Ex-Partner:     Emotionally Abused:     Physically Abused:     Sexually Abused:      Social History     Tobacco Use   Smoking Status Former Smoker    Quit date: 2018    Years since quitting: 3 5   Smokeless Tobacco Former User   Tobacco Comment    vaping for 1 month     Family History   Problem Relation Age of Onset    Thyroid cancer Father     Lung cancer Maternal Grandfather     Lung cancer Paternal Grandfather        The following portions of the patient's history were reviewed and updated as appropriate: allergies, current medications, past medical history, past social history, past surgical history and problem list     Results  No results found for this or any previous visit (from the past 1 hour(s))  ]  No results found for: PSA  Lab Results   Component Value Date    CALCIUM 9 2 01/09/2019    K 5 1 01/09/2019    CO2 28 01/09/2019     01/09/2019    BUN 14 01/09/2019    CREATININE 1 03 01/09/2019     No results found for: WBC, HGB, HCT, MCV, PLT

## 2022-03-23 ENCOUNTER — CLINICAL SUPPORT (OUTPATIENT)
Dept: FAMILY MEDICINE CLINIC | Facility: CLINIC | Age: 27
End: 2022-03-23
Payer: COMMERCIAL

## 2022-03-23 DIAGNOSIS — Z11.1 ENCOUNTER FOR PPD TEST: Primary | ICD-10-CM

## 2022-03-23 PROCEDURE — 86580 TB INTRADERMAL TEST: CPT

## 2022-03-23 NOTE — PROGRESS NOTES
PPD Placement note  Oswaldo Sam, 27 y o  male is here today for placement of PPD test  Reason for PPD test: employment - [employer]  Pt taken PPD test before: no  Verified in allergy area and with patient that they are not allergic to the products PPD is made of (Phenol or Tween)  Yes  Is patient taking any oral or IV steroid medication now or have they taken it in the last month? no  Has the patient ever received the BCG vaccine?: no  Has the patient been in recent contact with anyone known or suspected of having active TB disease?: no  O: Alert and oriented in NAD  P:  PPD placed on 3/23/2022  Patient advised to return for reading within 48-72 hours  Appt scheduled for Friday 03/25/22 at 4pm to have PPD read

## 2022-03-25 ENCOUNTER — CLINICAL SUPPORT (OUTPATIENT)
Dept: FAMILY MEDICINE CLINIC | Facility: CLINIC | Age: 27
End: 2022-03-25

## 2022-03-25 DIAGNOSIS — Z11.1 PPD SCREENING TEST: Primary | ICD-10-CM

## 2022-03-25 LAB
INDURATION: 0 MM
TB SKIN TEST: NEGATIVE

## 2022-03-25 NOTE — PROGRESS NOTES
PPD Reading Note  PPD read and results entered in [facility]  Result: 0 mm induration    Interpretation: negative  Allergic reaction: no

## 2023-09-27 ENCOUNTER — TELEPHONE (OUTPATIENT)
Age: 28
End: 2023-09-27